# Patient Record
Sex: FEMALE | Race: WHITE | NOT HISPANIC OR LATINO
[De-identification: names, ages, dates, MRNs, and addresses within clinical notes are randomized per-mention and may not be internally consistent; named-entity substitution may affect disease eponyms.]

---

## 2019-04-10 PROCEDURE — 88175 CYTOPATH C/V AUTO FLUID REDO: CPT | Performed by: OBSTETRICS & GYNECOLOGY

## 2019-07-22 ENCOUNTER — HOSPITAL (OUTPATIENT)
Dept: OTHER | Age: 24
End: 2019-07-22
Attending: EMERGENCY MEDICINE

## 2019-07-22 LAB
ANALYZER ANC (IANC): ABNORMAL
ANION GAP SERPL CALC-SCNC: 14 MMOL/L (ref 10–20)
APPEARANCE UR: NORMAL
BASOPHILS # BLD: 0.1 K/MCL (ref 0–0.3)
BASOPHILS NFR BLD: 1 %
BILIRUB UR QL STRIP: NEGATIVE
BUN SERPL-MCNC: 12 MG/DL (ref 6–20)
BUN/CREAT SERPL: 15 (ref 7–25)
CALCIUM SERPL-MCNC: 9.1 MG/DL (ref 8.4–10.2)
CHLORIDE SERPL-SCNC: 100 MMOL/L (ref 98–107)
CO2 SERPL-SCNC: 26 MMOL/L (ref 21–32)
COLOR UR: YELLOW
CREAT SERPL-MCNC: 0.8 MG/DL (ref 0.51–0.95)
DIFFERENTIAL METHOD BLD: ABNORMAL
EOSINOPHIL # BLD: 0.2 K/MCL (ref 0.1–0.5)
EOSINOPHIL NFR BLD: 2 %
ERYTHROCYTE [DISTWIDTH] IN BLOOD: 15.6 % (ref 11–15)
GLUCOSE SERPL-MCNC: 80 MG/DL (ref 65–99)
GLUCOSE UR STRIP-MCNC: NEGATIVE MG/DL
HCT VFR BLD CALC: 39.2 % (ref 36–46.5)
HEMOCCULT STL QL: NEGATIVE
HGB BLD-MCNC: 12.8 G/DL (ref 12–15.5)
IMM GRANULOCYTES # BLD AUTO: 0 K/MCL (ref 0–0.2)
IMM GRANULOCYTES NFR BLD: 0 %
KETONES UR STRIP-MCNC: NEGATIVE MG/DL
LEUKOCYTE ESTERASE UR QL STRIP: NEGATIVE
LYMPHOCYTES # BLD: 1.9 K/MCL (ref 1–4.8)
LYMPHOCYTES NFR BLD: 23 %
MCH RBC QN AUTO: 26.4 PG (ref 26–34)
MCHC RBC AUTO-ENTMCNC: 32.7 G/DL (ref 32–36.5)
MCV RBC AUTO: 81 FL (ref 78–100)
MONOCYTES # BLD: 1.1 K/MCL (ref 0.3–0.9)
MONOCYTES NFR BLD: 14 %
NEUTROPHILS # BLD: 4.9 K/MCL (ref 1.8–7.7)
NEUTROPHILS NFR BLD: 60 %
NEUTS SEG NFR BLD: ABNORMAL %
NITRITE UR QL STRIP: NEGATIVE
NRBC (NRBCRE): 0 /100 WBC
PH UR STRIP: 6.5 UNITS (ref 5–7)
PLATELET # BLD: 254 K/MCL (ref 140–450)
POTASSIUM SERPL-SCNC: 3.7 MMOL/L (ref 3.4–5.1)
PROT UR STRIP-MCNC: NEGATIVE MG/DL
RBC # BLD: 4.84 MIL/MCL (ref 4–5.2)
SODIUM SERPL-SCNC: 136 MMOL/L (ref 135–145)
SP GR UR STRIP: 1.02 (ref 1–1.03)
UROBILINOGEN UR STRIP-MCNC: 1 MG/DL (ref 0–1)
WBC # BLD: 8.2 K/MCL (ref 4.2–11)

## 2019-07-22 PROCEDURE — 93010 ELECTROCARDIOGRAM REPORT: CPT | Performed by: INTERNAL MEDICINE

## 2019-07-22 PROCEDURE — 99285 EMERGENCY DEPT VISIT HI MDM: CPT | Performed by: EMERGENCY MEDICINE

## 2020-04-10 PROBLEM — M79.5 FOREIGN BODY (FB) IN SOFT TISSUE: Status: ACTIVE | Noted: 2020-04-10

## 2020-06-03 ENCOUNTER — LAB ENCOUNTER (OUTPATIENT)
Dept: LAB | Facility: HOSPITAL | Age: 25
End: 2020-06-03
Attending: COLON & RECTAL SURGERY
Payer: MEDICAID

## 2020-06-03 DIAGNOSIS — Z01.818 PREOP TESTING: ICD-10-CM

## 2020-06-04 RX ORDER — CEFAZOLIN SODIUM/WATER 2 G/20 ML
2 SYRINGE (ML) INTRAVENOUS ONCE
Status: DISCONTINUED | OUTPATIENT
Start: 2020-06-05 | End: 2020-06-04

## 2020-06-05 ENCOUNTER — ANESTHESIA (OUTPATIENT)
Dept: SURGERY | Facility: HOSPITAL | Age: 25
End: 2020-06-05
Payer: MEDICAID

## 2020-06-05 ENCOUNTER — HOSPITAL ENCOUNTER (OUTPATIENT)
Facility: HOSPITAL | Age: 25
Setting detail: HOSPITAL OUTPATIENT SURGERY
Discharge: HOME | End: 2020-06-05
Attending: COLON & RECTAL SURGERY | Admitting: COLON & RECTAL SURGERY
Payer: MEDICAID

## 2020-06-05 ENCOUNTER — ANESTHESIA EVENT (OUTPATIENT)
Dept: SURGERY | Facility: HOSPITAL | Age: 25
End: 2020-06-05
Payer: MEDICAID

## 2020-06-05 VITALS
SYSTOLIC BLOOD PRESSURE: 100 MMHG | HEIGHT: 68 IN | RESPIRATION RATE: 20 BRPM | DIASTOLIC BLOOD PRESSURE: 55 MMHG | BODY MASS INDEX: 44.41 KG/M2 | HEART RATE: 64 BPM | OXYGEN SATURATION: 98 % | WEIGHT: 293 LBS | TEMPERATURE: 98 F

## 2020-06-05 DIAGNOSIS — Z01.818 PREOP TESTING: Primary | ICD-10-CM

## 2020-06-05 DIAGNOSIS — L05.92 PILONIDAL SINUS WITHOUT ABSCESS: ICD-10-CM

## 2020-06-05 PROCEDURE — 0HX8XZZ TRANSFER BUTTOCK SKIN, EXTERNAL APPROACH: ICD-10-PCS | Performed by: UROLOGY

## 2020-06-05 PROCEDURE — 81025 URINE PREGNANCY TEST: CPT

## 2020-06-05 PROCEDURE — 0HX9XZZ TRANSFER PERINEUM SKIN, EXTERNAL APPROACH: ICD-10-PCS | Performed by: UROLOGY

## 2020-06-05 PROCEDURE — 88304 TISSUE EXAM BY PATHOLOGIST: CPT | Performed by: COLON & RECTAL SURGERY

## 2020-06-05 PROCEDURE — 0JB90ZZ EXCISION OF BUTTOCK SUBCUTANEOUS TISSUE AND FASCIA, OPEN APPROACH: ICD-10-PCS | Performed by: UROLOGY

## 2020-06-05 RX ORDER — FAMOTIDINE 20 MG/1
20 TABLET ORAL ONCE
Status: COMPLETED | OUTPATIENT
Start: 2020-06-05 | End: 2020-06-05

## 2020-06-05 RX ORDER — MORPHINE SULFATE 4 MG/ML
2 INJECTION, SOLUTION INTRAMUSCULAR; INTRAVENOUS EVERY 10 MIN PRN
Status: DISCONTINUED | OUTPATIENT
Start: 2020-06-05 | End: 2020-06-05

## 2020-06-05 RX ORDER — HYDROCODONE BITARTRATE AND ACETAMINOPHEN 5; 325 MG/1; MG/1
1 TABLET ORAL AS NEEDED
Status: DISCONTINUED | OUTPATIENT
Start: 2020-06-05 | End: 2020-06-05

## 2020-06-05 RX ORDER — NALOXONE HYDROCHLORIDE 0.4 MG/ML
80 INJECTION, SOLUTION INTRAMUSCULAR; INTRAVENOUS; SUBCUTANEOUS AS NEEDED
Status: DISCONTINUED | OUTPATIENT
Start: 2020-06-05 | End: 2020-06-05

## 2020-06-05 RX ORDER — ONDANSETRON 2 MG/ML
INJECTION INTRAMUSCULAR; INTRAVENOUS AS NEEDED
Status: DISCONTINUED | OUTPATIENT
Start: 2020-06-05 | End: 2020-06-05 | Stop reason: SURG

## 2020-06-05 RX ORDER — HYDROCODONE BITARTRATE AND ACETAMINOPHEN 5; 325 MG/1; MG/1
1 TABLET ORAL EVERY 4 HOURS PRN
Qty: 30 TABLET | Refills: 0 | Status: SHIPPED | OUTPATIENT
Start: 2020-06-05 | End: 2020-06-15

## 2020-06-05 RX ORDER — HYDROMORPHONE HYDROCHLORIDE 1 MG/ML
0.2 INJECTION, SOLUTION INTRAMUSCULAR; INTRAVENOUS; SUBCUTANEOUS EVERY 5 MIN PRN
Status: DISCONTINUED | OUTPATIENT
Start: 2020-06-05 | End: 2020-06-05

## 2020-06-05 RX ORDER — HYDROMORPHONE HYDROCHLORIDE 1 MG/ML
0.4 INJECTION, SOLUTION INTRAMUSCULAR; INTRAVENOUS; SUBCUTANEOUS EVERY 5 MIN PRN
Status: DISCONTINUED | OUTPATIENT
Start: 2020-06-05 | End: 2020-06-05

## 2020-06-05 RX ORDER — MORPHINE SULFATE 10 MG/ML
6 INJECTION, SOLUTION INTRAMUSCULAR; INTRAVENOUS EVERY 10 MIN PRN
Status: DISCONTINUED | OUTPATIENT
Start: 2020-06-05 | End: 2020-06-05

## 2020-06-05 RX ORDER — HYDROCODONE BITARTRATE AND ACETAMINOPHEN 5; 325 MG/1; MG/1
2 TABLET ORAL AS NEEDED
Status: DISCONTINUED | OUTPATIENT
Start: 2020-06-05 | End: 2020-06-05

## 2020-06-05 RX ORDER — HALOPERIDOL 5 MG/ML
0.25 INJECTION INTRAMUSCULAR ONCE AS NEEDED
Status: DISCONTINUED | OUTPATIENT
Start: 2020-06-05 | End: 2020-06-05

## 2020-06-05 RX ORDER — HYDROMORPHONE HYDROCHLORIDE 1 MG/ML
0.6 INJECTION, SOLUTION INTRAMUSCULAR; INTRAVENOUS; SUBCUTANEOUS EVERY 5 MIN PRN
Status: DISCONTINUED | OUTPATIENT
Start: 2020-06-05 | End: 2020-06-05

## 2020-06-05 RX ORDER — LIDOCAINE HYDROCHLORIDE 10 MG/ML
INJECTION, SOLUTION EPIDURAL; INFILTRATION; INTRACAUDAL; PERINEURAL AS NEEDED
Status: DISCONTINUED | OUTPATIENT
Start: 2020-06-05 | End: 2020-06-05 | Stop reason: SURG

## 2020-06-05 RX ORDER — GLYCOPYRROLATE 0.2 MG/ML
INJECTION, SOLUTION INTRAMUSCULAR; INTRAVENOUS AS NEEDED
Status: DISCONTINUED | OUTPATIENT
Start: 2020-06-05 | End: 2020-06-05 | Stop reason: SURG

## 2020-06-05 RX ORDER — ONDANSETRON 2 MG/ML
4 INJECTION INTRAMUSCULAR; INTRAVENOUS ONCE AS NEEDED
Status: DISCONTINUED | OUTPATIENT
Start: 2020-06-05 | End: 2020-06-05

## 2020-06-05 RX ORDER — ROCURONIUM BROMIDE 10 MG/ML
INJECTION, SOLUTION INTRAVENOUS AS NEEDED
Status: DISCONTINUED | OUTPATIENT
Start: 2020-06-05 | End: 2020-06-05 | Stop reason: SURG

## 2020-06-05 RX ORDER — METRONIDAZOLE 500 MG/100ML
500 INJECTION, SOLUTION INTRAVENOUS ONCE
Status: COMPLETED | OUTPATIENT
Start: 2020-06-05 | End: 2020-06-05

## 2020-06-05 RX ORDER — PROCHLORPERAZINE EDISYLATE 5 MG/ML
5 INJECTION INTRAMUSCULAR; INTRAVENOUS ONCE AS NEEDED
Status: DISCONTINUED | OUTPATIENT
Start: 2020-06-05 | End: 2020-06-05

## 2020-06-05 RX ORDER — MIDAZOLAM HYDROCHLORIDE 1 MG/ML
INJECTION INTRAMUSCULAR; INTRAVENOUS AS NEEDED
Status: DISCONTINUED | OUTPATIENT
Start: 2020-06-05 | End: 2020-06-05 | Stop reason: SURG

## 2020-06-05 RX ORDER — NEOSTIGMINE METHYLSULFATE 1 MG/ML
INJECTION INTRAVENOUS AS NEEDED
Status: DISCONTINUED | OUTPATIENT
Start: 2020-06-05 | End: 2020-06-05 | Stop reason: SURG

## 2020-06-05 RX ORDER — SODIUM CHLORIDE, SODIUM LACTATE, POTASSIUM CHLORIDE, CALCIUM CHLORIDE 600; 310; 30; 20 MG/100ML; MG/100ML; MG/100ML; MG/100ML
INJECTION, SOLUTION INTRAVENOUS CONTINUOUS
Status: DISCONTINUED | OUTPATIENT
Start: 2020-06-05 | End: 2020-06-05

## 2020-06-05 RX ORDER — ACETAMINOPHEN 500 MG
1000 TABLET ORAL ONCE
Status: COMPLETED | OUTPATIENT
Start: 2020-06-05 | End: 2020-06-05

## 2020-06-05 RX ORDER — MORPHINE SULFATE 4 MG/ML
4 INJECTION, SOLUTION INTRAMUSCULAR; INTRAVENOUS EVERY 10 MIN PRN
Status: DISCONTINUED | OUTPATIENT
Start: 2020-06-05 | End: 2020-06-05

## 2020-06-05 RX ORDER — METOCLOPRAMIDE 10 MG/1
10 TABLET ORAL ONCE
Status: COMPLETED | OUTPATIENT
Start: 2020-06-05 | End: 2020-06-05

## 2020-06-05 RX ORDER — BUPIVACAINE HYDROCHLORIDE AND EPINEPHRINE 2.5; 5 MG/ML; UG/ML
INJECTION, SOLUTION INFILTRATION; PERINEURAL AS NEEDED
Status: DISCONTINUED | OUTPATIENT
Start: 2020-06-05 | End: 2020-06-05 | Stop reason: HOSPADM

## 2020-06-05 RX ORDER — CEPHALEXIN 500 MG/1
500 CAPSULE ORAL 3 TIMES DAILY
Qty: 12 CAPSULE | Refills: 1 | Status: SHIPPED | OUTPATIENT
Start: 2020-06-05 | End: 2022-02-03 | Stop reason: ALTCHOICE

## 2020-06-05 RX ORDER — DEXAMETHASONE SODIUM PHOSPHATE 4 MG/ML
VIAL (ML) INJECTION AS NEEDED
Status: DISCONTINUED | OUTPATIENT
Start: 2020-06-05 | End: 2020-06-05 | Stop reason: SURG

## 2020-06-05 RX ORDER — METRONIDAZOLE 500 MG/1
500 TABLET ORAL 3 TIMES DAILY
Qty: 30 TABLET | Refills: 0 | Status: SHIPPED | OUTPATIENT
Start: 2020-06-05 | End: 2020-06-15

## 2020-06-05 RX ADMIN — ONDANSETRON 4 MG: 2 INJECTION INTRAMUSCULAR; INTRAVENOUS at 08:10:00

## 2020-06-05 RX ADMIN — SODIUM CHLORIDE, SODIUM LACTATE, POTASSIUM CHLORIDE, CALCIUM CHLORIDE: 600; 310; 30; 20 INJECTION, SOLUTION INTRAVENOUS at 07:51:00

## 2020-06-05 RX ADMIN — SODIUM CHLORIDE, SODIUM LACTATE, POTASSIUM CHLORIDE, CALCIUM CHLORIDE: 600; 310; 30; 20 INJECTION, SOLUTION INTRAVENOUS at 09:58:00

## 2020-06-05 RX ADMIN — ROCURONIUM BROMIDE 30 MG: 10 INJECTION, SOLUTION INTRAVENOUS at 08:10:00

## 2020-06-05 RX ADMIN — METRONIDAZOLE 500 MG: 500 INJECTION, SOLUTION INTRAVENOUS at 08:14:00

## 2020-06-05 RX ADMIN — SODIUM CHLORIDE, SODIUM LACTATE, POTASSIUM CHLORIDE, CALCIUM CHLORIDE: 600; 310; 30; 20 INJECTION, SOLUTION INTRAVENOUS at 09:00:00

## 2020-06-05 RX ADMIN — LIDOCAINE HYDROCHLORIDE 50 MG: 10 INJECTION, SOLUTION EPIDURAL; INFILTRATION; INTRACAUDAL; PERINEURAL at 07:56:00

## 2020-06-05 RX ADMIN — NEOSTIGMINE METHYLSULFATE 5 MG: 1 INJECTION INTRAVENOUS at 09:58:00

## 2020-06-05 RX ADMIN — DEXAMETHASONE SODIUM PHOSPHATE 4 MG: 4 MG/ML VIAL (ML) INJECTION at 08:10:00

## 2020-06-05 RX ADMIN — GLYCOPYRROLATE 0.6 MG: 0.2 INJECTION, SOLUTION INTRAMUSCULAR; INTRAVENOUS at 09:58:00

## 2020-06-05 RX ADMIN — SODIUM CHLORIDE, SODIUM LACTATE, POTASSIUM CHLORIDE, CALCIUM CHLORIDE: 600; 310; 30; 20 INJECTION, SOLUTION INTRAVENOUS at 07:48:00

## 2020-06-05 RX ADMIN — GLYCOPYRROLATE 0.1 MG: 0.2 INJECTION, SOLUTION INTRAMUSCULAR; INTRAVENOUS at 07:56:00

## 2020-06-05 RX ADMIN — MIDAZOLAM HYDROCHLORIDE 2 MG: 1 INJECTION INTRAMUSCULAR; INTRAVENOUS at 07:51:00

## 2020-06-05 RX ADMIN — ROCURONIUM BROMIDE 10 MG: 10 INJECTION, SOLUTION INTRAVENOUS at 07:56:00

## 2020-06-05 NOTE — ANESTHESIA PROCEDURE NOTES
Airway  Date/Time: 6/5/2020 7:59 AM  Urgency: Elective    Airway not difficult    General Information and Staff    Patient location during procedure: OR  Anesthesiologist: Lisa Orlando MD  Resident/CRNA: Little Abdi CRNA  Performed: CRNA     In

## 2020-06-05 NOTE — ANESTHESIA PREPROCEDURE EVALUATION
Anesthesia PreOp Note    HPI:     Isaías Blkam is a 26year old female who presents for preoperative consultation requested by: Salima Haas MD    Date of Surgery: 6/5/2020    Procedure(s):  PILONIDAL CYST  Indication: Pilonidal sinus without abscess [L05 strain: Not on file      Food insecurity:        Worry: Not on file        Inability: Not on file      Transportation needs:        Medical: Not on file        Non-medical: Not on file    Tobacco Use      Smoking status: Current Every Day Smoker        Pac reviewed    Airway   Mallampati: I  TM distance: >3 FB  Neck ROM: full  Dental - normal exam     Pulmonary - negative ROS and normal exam   Cardiovascular - negative ROS    Rhythm: regular  Rate: normal    Neuro/Psych - negative ROS     GI/Hepatic/Renal -

## 2020-06-05 NOTE — ANESTHESIA POSTPROCEDURE EVALUATION
Patient: Yary Hoang    Procedure Summary     Date:  06/05/20 Room / Location:  [facility] OR 03 / [facility] OR    Anesthesia Start:  0482 Anesthesia Stop:  6877    Procedure:  PILONIDAL CYST (N/A Buttocks) Diagnosis:       Pilonidal sinus without abscess

## 2020-06-05 NOTE — PROGRESS NOTES
Per Dr. Lala Bellamy, patient to \"exchange\" [place] prescription for Percocet. Patient to go to [place] office and  new script. Verbalized understanding and will follow up in office.

## 2020-06-05 NOTE — H&P
[facility]  Preop H&P - Colon and Rectal Surgery  Will Guzman MD    CHIEF COMPLAINT:  Pilonidal Disease     HISTORY OF PRESENT ILLNESS:  Paul Tee is a 26year old female who presents for surgery for pilonidal disease.     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

## 2020-06-05 NOTE — OPERATIVE REPORT
PATIENT NAME: Dean Bella  MR#: [id number]  Operative Note    Patient Name: Dean Bella    Date of Surgery: 06/05/20    Preoperative Diagnosis: Pilonidal sinus without abscess [L05.92]    Postoperative Diagnosis: same    Primary Surgeon: Willian Gordon then gently curved in a comma shape towards the anus. The lateral incision connected the two ends of the medial incision and remained 1 cm lateral to the line of contact drawn with the patient standing before surgery.  The flaps to be raised were also [facility] incision was closed in a straight configuration. The existing drain was flipped back on itself and placed under the skin in the midline and the skin was then reapproximated using interrupted 3-0 Vicryl buried subdermal sutures.  Finally, skin was closed

## 2020-06-05 NOTE — PROGRESS NOTES
Educated patient and significant other on all discharge instructions, demonstrated all drain care and dressing change. Significant other able to verbalize understanding. Drain log sheet and supplies provided for dressing change.

## 2020-06-05 NOTE — DISCHARGE SUMMARY
Outpatient Surgery Brief Discharge Summary         Patient ID:  Kwadwo Walters  [id number]  81 year old  4/17/1995    Discharge Diagnoses: Pilonidal sinus without abscess [L05.92]    Procedures:   Discharge Procedure Orders   SARS-CoV-2 by PCR   Standing St

## 2022-02-03 PROBLEM — O99.210 OBESITY IN PREGNANCY WITH ANTEPARTUM COMPLICATION: Status: ACTIVE | Noted: 2022-02-03

## 2022-02-03 PROBLEM — O09.299 HISTORY OF GESTATIONAL DIABETES IN PRIOR PREGNANCY, CURRENTLY PREGNANT, UNSPECIFIED TRIMESTER: Status: ACTIVE | Noted: 2022-02-03

## 2022-02-03 PROBLEM — O26.03 EXCESSIVE WEIGHT GAIN DURING PREGNANCY IN THIRD TRIMESTER: Status: ACTIVE | Noted: 2022-02-03

## 2022-02-03 PROBLEM — O09.33 INSUFFICIENT PRENATAL CARE IN THIRD TRIMESTER: Status: ACTIVE | Noted: 2022-02-03

## 2022-02-03 PROBLEM — Z86.32 HISTORY OF GESTATIONAL DIABETES IN PRIOR PREGNANCY, CURRENTLY PREGNANT, UNSPECIFIED TRIMESTER: Status: ACTIVE | Noted: 2022-02-03

## 2022-02-08 ENCOUNTER — LAB SERVICES (OUTPATIENT)
Dept: LAB | Age: 27
End: 2022-02-08

## (undated) DEVICE — SUTURE VICRYL 2-0 CT-2

## (undated) DEVICE — GAMMEX® PI HYBRID SIZE 7.5, STERILE POWDER-FREE SURGICAL GLOVE, POLYISOPRENE AND NEOPRENE BLEND: Brand: GAMMEX

## (undated) DEVICE — ELECTRODE ESURG 2.75IN EZ CLN

## (undated) DEVICE — DRAIN JACKSON PRATT 10FR 1/8END: Brand: CARDINAL HEALTH

## (undated) DEVICE — 1010 S-DRAPE TOWEL DRAPE 10/BX: Brand: STERI-DRAPE™

## (undated) DEVICE — DRAPE SHEET LAPAROTOMY

## (undated) DEVICE — SYRINGE MNJCT 10ML LF STRL REG

## (undated) DEVICE — TOWEL OR BLU 16X26 STRL

## (undated) DEVICE — CAUTERY: TIP CLEANER XR 100/CS: Brand: MEDICAL ACTION INDUSTRIES

## (undated) DEVICE — 3M™ STERI-STRIP™ COMPOUND BENZOIN TINCTURE 40 BAGS/CARTON 4 CARTONS/CASE C1544: Brand: 3M™ STERI-STRIP™

## (undated) DEVICE — BLADE 10MM SS HIP OSTM RADL

## (undated) DEVICE — SUTURE MONOCRYL 4-0 PS-2

## (undated) DEVICE — DERMABOND LIQUID ADHESIVE

## (undated) DEVICE — SUTURE ETHILON 3-0 669H

## (undated) DEVICE — DRAIN RESERVOIR RELIAVAC 100CC

## (undated) DEVICE — SUTURE VICRYL 2-0 SH

## (undated) DEVICE — MINOR GENERAL: Brand: MEDLINE INDUSTRIES, INC.

## (undated) DEVICE — SUTURE VICRYL 3-0 SH

## (undated) DEVICE — Device: Brand: JELCO

## (undated) DEVICE — ABDOMINAL BINDER: Brand: DEROYAL

## (undated) DEVICE — SOL  .9 1000ML BTL

## (undated) DEVICE — FLEXIBLE YANKAUER,MEDIUM TIP, NO VACUUM CONTROL: Brand: ARGYLE